# Patient Record
Sex: FEMALE
[De-identification: names, ages, dates, MRNs, and addresses within clinical notes are randomized per-mention and may not be internally consistent; named-entity substitution may affect disease eponyms.]

---

## 2022-07-20 PROBLEM — Z00.00 ENCOUNTER FOR PREVENTIVE HEALTH EXAMINATION: Status: ACTIVE | Noted: 2022-07-20

## 2022-07-29 ENCOUNTER — APPOINTMENT (OUTPATIENT)
Dept: ORTHOPEDIC SURGERY | Facility: CLINIC | Age: 57
End: 2022-07-29

## 2022-07-29 VITALS — WEIGHT: 150 LBS | BODY MASS INDEX: 23.54 KG/M2 | HEIGHT: 67 IN

## 2022-07-29 DIAGNOSIS — M72.2 PLANTAR FASCIAL FIBROMATOSIS: ICD-10-CM

## 2022-07-29 PROCEDURE — 99204 OFFICE O/P NEW MOD 45 MIN: CPT

## 2022-07-29 NOTE — ASSESSMENT
[FreeTextEntry1] : WBAT in supportive footwear, air heel. \par Ice to affected area.\par Recommend a course of PT.\par Pt. has Naprosyn at home she will take prn.

## 2022-07-29 NOTE — PHYSICAL EXAM
[Left] : left foot and ankle [2nd] : 2nd [NL (40)] : plantar flexion 40 degrees [NL 30)] : inversion 30 degrees [NL (20)] : eversion 20 degrees [5___] : [facility] 5[unfilled]/5 [2+] : posterior tibialis pulse: 2+ [Normal] : saphenous nerve sensation normal [] : patient ambulates without assistive device [de-identified] : Decreased sensation medial hallux.

## 2022-07-29 NOTE — HISTORY OF PRESENT ILLNESS
[Gradual] : gradual [7] : 7 [4] : 4 [Dull/Aching] : dull/aching [Sharp] : sharp [Intermittent] : intermittent [Household chores] : household chores [Leisure] : leisure [Social interactions] : social interactions [Rest] : rest [Sitting] : sitting [Standing] : standing [Walking] : walking [Stairs] : stairs [de-identified] : Pt. is a 57 year old female who presents for evaluation of her LT foot/heel. Denies trauma. Symptoms for over a year. Previous bunionectomy 2016. Has been evaluated by rheumatologist and podiatrist. Ice to affected area, tried heel cups and gel socks which helped. WB in sandals.  [] : Post Surgical Visit: no [FreeTextEntry1] : L Foot/Ankle/Heel